# Patient Record
Sex: FEMALE | Race: WHITE
[De-identification: names, ages, dates, MRNs, and addresses within clinical notes are randomized per-mention and may not be internally consistent; named-entity substitution may affect disease eponyms.]

---

## 2021-12-15 ENCOUNTER — HOSPITAL ENCOUNTER (OUTPATIENT)
Dept: HOSPITAL 47 - SLEEP | Age: 51
Discharge: HOME | End: 2021-12-15
Attending: INTERNAL MEDICINE
Payer: COMMERCIAL

## 2021-12-15 DIAGNOSIS — Z53.9: Primary | ICD-10-CM

## 2021-12-15 NOTE — CONS
CONSULTATION



DATE OF SERVICE:

12/15/2021



This 51-year-old lady has been evaluated in Sleep Center for significant excessive

daytime sleepiness, multiple awakenings from sleep and also difficulties initiating

sleep.



HISTORY OF PRESENT ILLNESS/SLEEP-WAKE EVALUATION:

This patient had a home sleep apnea test which was done by her dentist in 2018 and the

sleep study was negative for obstructive sleep apnea-hypopnea syndrome at that time.



The patient's usual sleep schedule is from 9 p.m. to 6 a.m. basically 7 days a week.

She does have problems with falling asleep. No TV in bedroom.  She usually sleeps on

the back and side positions.  She does snore but rarely. During the night she has

multiple awakenings and multiple episodes of nocturia.  She has restless leg symptoms

while falling asleep and is tossing and turning during the night.



No history of hypnagogic hallucinations, cataplexy or sleep paralysis.



During the day, the patient feels significant sleepiness.  [place] Sleepiness Scale is

in extremely high range at 20.  She may take naps, depending on her schedule.



The patient has problems with memory, concentration, irritability, depression, anxiety.

She worries about her sleep and has difficulties paying attention.



PAST MEDICAL HISTORY:

Positive for hypertension, diabetes mellitus, hyperlipidemia, mononucleosis in 2021,

episodes of palpitations, sometimes during the night.



PAST SURGICAL HISTORY:

Tonsillectomy, total hysterectomy, right knee surgery.



CURRENT MEDICATIONS:

1. Metformin 1000 mg 2 tablets a day.

2. Lexapro 20 mg once a day.

3. Lipitor 10 mg once a day.

4. Lisinopril 5 mg once a day.

5. Lyrica 50 mg once a day.

6. Magnesium.

7. Vitamin B.

8. CoQ 10 supplement.



SOCIAL HISTORY:

Negative for smoking. Alcohol consumption occasional.



FAMILY HISTORY:

Positive for hypertension, asthma, diabetes, mental illness.



PHYSICAL EXAMINATION:

GENERAL: Pleasant  lady without distress.

VITAL SIGNS: /94, HR 79, RR 18, height 5 feet 4 inches, weight 203.6, body mass

index 34.4, temperature 97.6, oxygen saturation at room air 94%.

HEENT: PERRLA, EOMI, evaluation of oropharynx showed tongue protrudes midline.

Extremely low position of soft palate; Mallampati IV.

NECK:  Supple, no JVD.  Thyroid is not palpable. Neck measures 16-3/4 inches in

circumference.

LUNGS:  Clear to percussion and to auscultation.  Good air exchange.  No wheezing or

rhonchi.

HEART:  S1, S2 regular.  No murmurs, gallops, or rubs.

ABDOMEN:  Obese.

EXTREMITIES: No clubbing or cyanosis.

CNS:  Awake, alert, and oriented X3.  Cranial nerves 2 to 7 intact.  There is no

fasciculation or atrophy. noted.  No focal deficits observed.



IMPRESSION:

1. Snoring, multiple awakenings from sleep with nocturia, extremely low position of

    soft palate, Mallampati IV, wide neck, 16-3/4 inches in circumference, but very

    rare snoring; possible obstructive sleep apnea-hypopnea syndrome.

2. Significant excessive daytime sleepiness.  [place] Sleepiness Scale is 20.

    Differential diagnosis should include hypersomnia and narcolepsy.

3. Restless leg symptoms, restless legs syndrome.

4. Difficulties to initiate sleep secondary to psychophysiological insomnia, anxiety

    and restless leg symptoms.

5. Tossing and turning during the night; possibly periodic limb movements.

6. Obesity. BMI 34.8.

7. Hypertension.

8. Diabetes mellitus.

9. Hyperlipidemia.

10.History of mononucleosis in 2021.

11.Status post tonsillectomy.

12.Status post total hysterectomy.

13.Status post right knee surgery.

14.Episodes of palpitations, some during sleep.



PLAN:

1. I discussed with the patient psychological techniques for treatment of insomnia,

    including stimulus control, paradoxical intention, worry time, no watching clock,

    increasing exercise during the day.

2. Polysomnography for evaluation of patient's breathing during sleep.

3. CPAP/BiPAP titration if sleep study confirms obstructive sleep apnea-hypopnea

    syndrome.

4. Preferable position during sleep on the side.

5. No driving if patient feels any sleepiness.

6. I will see patient for follow up visit to explain results of testing and following

    plan.

7. Multiple sleep latency test if sleep test is negative for obstructive sleep apnea-

    hypopnea syndrome.

8. Please check iron profile, including ferritin level.  Low level of iron increases

    risk for periodic limb movements and restless legs.



Thank you very much for referring this patient for consultation.



Sincerely,







Bismark Rowley MD, PhD, FAASM

Diplomat of American [employer] of [employer] Specialties

Sleep Medicine Board of American Board of Internal Medicine

Medical Director of [facility] [place]





MMMARILUZ / ABY: [id number] / Job#: [id number]

## 2022-02-17 ENCOUNTER — HOSPITAL ENCOUNTER (OUTPATIENT)
Dept: HOSPITAL 47 - SLEEP | Age: 52
Discharge: HOME | End: 2022-02-17
Attending: INTERNAL MEDICINE
Payer: COMMERCIAL

## 2022-02-17 DIAGNOSIS — E78.5: ICD-10-CM

## 2022-02-17 DIAGNOSIS — G47.30: Primary | ICD-10-CM

## 2022-02-17 DIAGNOSIS — I10: ICD-10-CM

## 2022-02-17 DIAGNOSIS — Z90.89: ICD-10-CM

## 2022-02-17 DIAGNOSIS — Z96.651: ICD-10-CM

## 2022-02-17 DIAGNOSIS — E11.9: ICD-10-CM

## 2022-02-17 DIAGNOSIS — G25.81: ICD-10-CM

## 2022-02-17 NOTE — SFUN
SLEEP CENTER FOLLOW UP NOTE



DATE OF SERVICE:

02/17/2022



This 51-year-old lady has been followed in Sleep Center for excessive daytime

sleepiness. We did a polysomnogram with multiple sleep latency test, and I discussed

results of these sleep studies with the patient in detail.



Diagnostic polysomnogram did not show any significant respiratory abnormalities; normal

oxygenation during the sleep. Significant amount of leg movements were documented with

periodic limb movement index of 34.1 counting the whole night, but the patient has most

of the events related to the first part of the night. Taking that into consideration,

the index would be around 70 per hour. She continues to experience discomfort in her

legs while falling asleep, and that could be the reason for her difficulties falling

asleep. Sometimes she wakes up because of that and does not sleep well.



[place] Sleepiness Scale today is significantly increased at 18.



MEDICATIONS:

1. Metformin.

2. Lexapro 20 mg once a day.

3. Lipitor 10 mg once a day.

4. Lisinopril 5 mg once a day.

5. Lyrica 50 mg once a day.

6. Magnesium supplement.

7. Vitamin B supplement.



PHYSICAL EXAMINATION:

GENERAL: Pleasant patient in no distress.

VITAL SIGNS: /93, HR 74, RR 16, weight 201.8, height 5 feet and 4 inches,

temperature 97.0, oxygen saturation at room air 96%.

HEENT: PERRLA, EOMI, evaluation of oropharynx showed tongue protrudes midline.

NECK:  Supple, no JVD.  Thyroid is not palpable.

LUNGS:  Clear to percussion and to auscultation.  Good air exchange.  No wheezing or

rhonchi.

HEART:  S1, S2 regular.  No murmurs, gallops, or rubs.

ABDOMEN:  Soft and nontender.  Bowel sounds are present.  No organomegaly appreciated.

EXTREMITIES: No clubbing or cyanosis.

CNS:  Awake, alert, and oriented X3.  Cranial nerves 2 to 7 intact.  There is no

fasciculation or atrophy. noted.  No focal deficits observed.



IMPRESSION:

1. No significant respiratory abnormalities during the sleep test; normal oxygenation

    during sleep.

2. Significant periodic limb movements have been documented; possibly periodic limb

    movement syndrome.

3. Restless legs syndrome.

4. Multiple sleep latency test confirmed sleepiness and mean sleep latency 9.3

    minutes, but it is longer than for the standard diagnosis of narcolepsy.

5. Hypertension.

6. Diabetes mellitus.

7. Hyperlipidemia.

8. History of mononucleosis in 2021.

9. Status post tonsillectomy.

10.Status post total hysterectomy.

11.Status post right knee surgery.



PLAN:

1. Patient will be started on smallest dose of dopaminergic agonist Mirapex at

    bedtime.

2. Iron profile with ferritin level.  Low level of ferritin below 50 mcg/mL indicates

    necessity to replace iron that could be a precipitating factor for developing

    restless legs and periodic limb movements.

3. Sleep hygiene with regular time in bed for at least 8 hours.

4. Precautions related to driving. No driving if feeling any sleepiness.  The patient

    is aware of civil and criminal liability for unsafe driving.

Thank you very much for allowing me to participate in the management of your patient.



Sincerely,







Bismark Rowley MD, PhD, FAASM

Diplomat of American Board of Medical Specialties

Sleep Medicine Board of American Board of Internal Medicine

Medical Director of [facility] [place]





MMODL / IJN: [id number] / Job#: [id number]

## 2022-06-30 ENCOUNTER — HOSPITAL ENCOUNTER (OUTPATIENT)
Dept: HOSPITAL 47 - SLEEP | Age: 52
Discharge: HOME | End: 2022-06-30
Attending: INTERNAL MEDICINE
Payer: COMMERCIAL

## 2022-06-30 DIAGNOSIS — I10: ICD-10-CM

## 2022-06-30 DIAGNOSIS — E78.5: ICD-10-CM

## 2022-06-30 DIAGNOSIS — G47.10: Primary | ICD-10-CM

## 2022-06-30 DIAGNOSIS — Z79.84: ICD-10-CM

## 2022-06-30 DIAGNOSIS — G25.81: ICD-10-CM

## 2022-06-30 DIAGNOSIS — Z79.899: ICD-10-CM

## 2022-06-30 DIAGNOSIS — E11.9: ICD-10-CM

## 2022-06-30 DIAGNOSIS — Z90.09: ICD-10-CM

## 2022-06-30 DIAGNOSIS — J45.909: ICD-10-CM

## 2022-06-30 NOTE — P.PN
Subjective


DATE: 06/30/2022





FOLLOW UP VISIT.


 52-year-old lady has been followed in sleep center for treatment of significant

excessive daytime sleepiness





Previous multiple sleep latency test showed mean sleep latency 9.3 minutes which

does indicate sleepiness but. sleep latency less then for diagnosis of 

narcolepsy.  Patient was started on treatment for periodic limb movements with 

the goal that improvements of periodic limb movements and restless leg syndrome 

might improve her alertness during the day, but patient continued to have 

sleepiness [place] sleepiness scale today significantly increased to 18.  

Patient feels better with the usage of Mirapex at bedtime[].








MEDICATIONS:1.  Metformin 500 mg 3 times a day


                          2.  Lisinopril 5 mg once a day


                          3.  Atorvastatin 10 mg once a day


                          4.  Lyrica 50 mg once a day


                          5.  Prednisone


                          6.  Benadryl


                         


During physical exam:


GENERAL: A pleasant patient without any distress. 


VITAL SIGNS: /95, , RR 12 , weight 199.2, temperature 5.4, oxygen 

saturation at room air 97%, height 5 foot 4 inches, body mass index 34.1 .


HEENT: CHAO, ANTWONMI.[] .


NECK: Supple. No JVD. 


LUNGS: Clear to percussion and to auscultation. Good air exchange.  Few rhonchi.

 


HEART: S1, S2 regular. 


ABDOMEN: Soft and nontender.[]  


EXTREMITIES: No clubbing or cyanosis. 


CNS: Awake, alert, and oriented x3.  No focal deficit. 





Impressions:


1.  Restless leg syndrome and periodic limb movements improved on treatment with

 Mirapex.


2.  Significant excessive daytime sleepiness with an [place] Sleepiness Scale of

 18 the necessity for differential diagnosis with narcolepsy.


3.  Status post coronary 19 in March 2022.


4.  Asthma.


5.  Hypertension.


6.  Diabetes mellitus.


7.  Hyperlipidemia.


8.  History of mononucleosis in 2021.


9.  Status post tonsillectomy.











Plan:


1.  Repeat multiple sleep latency test for confirmation diagnosis of 

hypersomnia. 


2.  Sleep hygiene with regular time in bed for at least 8 hours.


3.  Extreme precautions related to driving. No driving if feel any sleepiness.  

Patient promised to follow recommendations.  She is aware about civil and 

criminal liability for unsafe driving.


4.  Following plan after reviewing results of multiple sleep latency test


5.  Continue Mirapex with the lowest dose before bedtime








Thank you very much for allowing me to participate in the management of your 

patient.








Bismark Rowley MD, PhD, FAASM.


Diplomat of American Board of Sleep Medicine,


Sleep Medicine Board by American Board of Internal Medicine


Medical Director of [facility]

## 2022-09-08 ENCOUNTER — HOSPITAL ENCOUNTER (OUTPATIENT)
Dept: HOSPITAL 47 - SLEEP | Age: 52
Discharge: HOME | End: 2022-09-08
Attending: INTERNAL MEDICINE
Payer: COMMERCIAL

## 2022-09-08 DIAGNOSIS — E78.5: ICD-10-CM

## 2022-09-08 DIAGNOSIS — E11.9: ICD-10-CM

## 2022-09-08 DIAGNOSIS — Z98.890: ICD-10-CM

## 2022-09-08 DIAGNOSIS — E66.9: ICD-10-CM

## 2022-09-08 DIAGNOSIS — I10: Primary | ICD-10-CM

## 2022-09-08 DIAGNOSIS — Z90.89: ICD-10-CM

## 2022-09-08 PROCEDURE — 99212 OFFICE O/P EST SF 10 MIN: CPT

## 2022-09-08 NOTE — P.PN
Subjective


DATE: 09/08/2022





FOLLOW UP VISIT.





Patient returned to sleep center for follow-up visit to discuss results of sleep

study and following plan.  I discuss results of sleep studies with patient in 

details.  Polysomnogram did not show any significant respiratory abnormalities. 

Periodic limb movements have been documented at the beginning cold the sleep 

test.  Following multiple sleep latency test showed pathological sleepiness with

mean sleep latency 3.5 minutes.  [place] Sleepiness Scale today is in high range

of 17..  





MEDICATIONS:1.  Metformin 1000 mg twice a day


                          2.  Lipitor 10 mg once a day


                          3. Lyrica 50 mg twice a day


                          4.  Albuterol


                          5.  Mirapex 0.125 mg once a day


                          6.  Losartan 25 mg once a day


                          7.  Trulicity once a week


                         





During physical exam:


GENERAL: A pleasant patient without any distress. 


VITAL SIGNS: /87, HR 88, RR 16 , weight 193, temperature 97.1, oxygen 

saturation at room air 96% .


HEENT: PERRLA, EOMI.


NECK: Supple. No JVD. 


LUNGS: Clear to percussion and to auscultation. Good air exchange. No wheezing 

or rhonchi. 


HEART: S1, S2 regular. 


ABDOMEN: Soft and nontender. 


EXTREMITIES: No clubbing or cyanosis. 


CNS: Awake, alert, and oriented x3.  No focal deficit. 





Impressions:


1.  No significant respiratory abnormalities during the sleep test


2.  Periodic limb movements have been documented during first half of the sleep 

test at night.


3.  Multiple sleep latency test confirmed pathological sleepiness with very 

short mean sleep latency 3.5 minutes.  Most probably narcolepsy type II


4.  Hypertension.


5.  Mild obesity.


6.  Diabetes mellitus.


7.  Hyperlipidemia.


8.  History of mononucleosis in 2021.


9.  Status post tonsillectomy.


10 status post total hysterectomy


11.  Status post right knee surgery








Plan:


1.  Patient will be started on treatment with Adderall 5 mg twice a day. 


2. Sleep hygiene with regular time in bed for at least 8 hours.


3.  Daytime naps permitted 


4.  Precautions related to driving. No driving if feel any sleepiness.  Patient 

is aware about civil and criminal liability for unsafe driving, promised to 

follow recommendations.


5.   Continue Mirapex 0.125 mg at bedtime


6.  Follow up visit in one months.








Thank you very much for allowing me to participate in the management of your 

patient.








Bismark Rowley MD, PhD, FAASM.


Diplomat of American Board of Sleep Medicine,


Sleep Medicine Board by American Board of Internal Medicine


Medical Director of [facility]